# Patient Record
Sex: MALE | Race: WHITE
[De-identification: names, ages, dates, MRNs, and addresses within clinical notes are randomized per-mention and may not be internally consistent; named-entity substitution may affect disease eponyms.]

---

## 2018-07-25 ENCOUNTER — HOSPITAL ENCOUNTER (OUTPATIENT)
Dept: HOSPITAL 47 - OR | Age: 18
Discharge: HOME | End: 2018-07-25
Payer: COMMERCIAL

## 2018-07-25 VITALS — DIASTOLIC BLOOD PRESSURE: 74 MMHG | HEART RATE: 71 BPM | SYSTOLIC BLOOD PRESSURE: 113 MMHG

## 2018-07-25 VITALS — RESPIRATION RATE: 16 BRPM

## 2018-07-25 VITALS — BODY MASS INDEX: 19 KG/M2

## 2018-07-25 VITALS — TEMPERATURE: 97.3 F

## 2018-07-25 DIAGNOSIS — Q24.8: ICD-10-CM

## 2018-07-25 DIAGNOSIS — L05.91: Primary | ICD-10-CM

## 2018-07-25 PROCEDURE — 11770 EXC PILONIDAL CYST SIMPLE: CPT

## 2018-07-25 PROCEDURE — 88304 TISSUE EXAM BY PATHOLOGIST: CPT

## 2018-07-25 RX ADMIN — CEFAZOLIN ONE GM: 10 INJECTION, POWDER, FOR SOLUTION INTRAVENOUS at 08:01

## 2018-07-25 RX ADMIN — METRONIDAZOLE ONE MLS: 500 INJECTION, SOLUTION INTRAVENOUS at 08:02

## 2018-07-25 RX ADMIN — HYDROMORPHONE HYDROCHLORIDE PRN MG: 1 INJECTION, SOLUTION INTRAMUSCULAR; INTRAVENOUS; SUBCUTANEOUS at 09:40

## 2018-07-25 RX ADMIN — CEFAZOLIN ONE GM: 10 INJECTION, POWDER, FOR SOLUTION INTRAVENOUS at 08:02

## 2018-07-25 RX ADMIN — METRONIDAZOLE ONE MLS: 500 INJECTION, SOLUTION INTRAVENOUS at 08:20

## 2018-07-25 RX ADMIN — HYDROMORPHONE HYDROCHLORIDE PRN MG: 1 INJECTION, SOLUTION INTRAMUSCULAR; INTRAVENOUS; SUBCUTANEOUS at 09:33

## 2018-07-25 NOTE — P.GSHP
History of Present Illness


H&P Date: 07/25/18


Chief Complaint: Pilonidal cyst





Patient is a 17-year-old male who has had complaints of pain in the region of 

the pilonidal region over the last several months.  Denies fevers.  States it 

is actually gotten somewhat smaller since his last evaluation.  Patient has a 

sinus opening measuring about 3-4 mm with no erythema.  No prior surgical 

intervention or drainage.





Past Medical History


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  

PILONIDAL CYST


History of Any Multi-Drug Resistant Organisms: None Reported


Past Surgical History: No Surgical Hx Reported


Past Anesthesia/Blood Transfusion Reactions: No Reported Reaction


Additional Past Anesthesia/Blood Transfusion Reaction / Comment(s): NO PRIOR SX 

HX


Smoking Status: Never smoker





- Past Family History


  ** Mother


Family Medical History: No Reported History





Medications and Allergies


 Home Medications











 Medication  Instructions  Recorded  Confirmed  Type


 


No Known Home Medications  07/19/18 07/25/18 History











 Allergies











Allergy/AdvReac Type Severity Reaction Status Date / Time


 


No Known Allergies Allergy   Verified 07/25/18 06:52














Surgical - Exam


 Vital Signs











Temp Pulse Resp BP Pulse Ox


 


 97.6 F   79   16   117/75   97 


 


 07/25/18 06:51  07/25/18 06:51  07/25/18 06:51  07/25/18 06:51  07/25/18 06:51

















Physical exam:


General: Well-developed, well-nourished


HEENT: Normocephalic, sclerae nonicteric


Abdomen: Nontender, nondistended


Extremities: No edema, pilonidal region with 3-4 mm sinus


Neuro: Alert and oriented





Assessment and Plan


(1) Pilonidal cyst


Narrative/Plan: 


Will proceed with pilonidal cystectomy at this time.  Risks were previously 

discussed with the patient.


Current Visit: Yes   Status: Acute   Code(s): L05.91 - PILONIDAL CYST WITHOUT 

ABSCESS   SNOMED Code(s): 30738298

## 2018-07-25 NOTE — P.OP
Date of Procedure: 07/25/18


Procedure(s) Performed: 


PREOPERATIVE DIAGNOSIS: Pilonidal cyst


POSTOPERATIVE DIAGNOSIS: Same


PROCEDURE: Pilonidal cystectomy


SURGEON: Flora YORKL: Minimal


ANESTHESIA: General


COMPLICATIONS: None


OPERATIVE PROCEDURE: Patient was placed prone on the operating table.  The 

gluteal crease was prepped and draped in usual sterile fashion after the 

patient was placed in the prone jackknife position.  The pilonidal cyst opening 

was instilled with methylene blue solution.  An elliptical incision was made 

around the pilonidal cyst opening.  Dissection took place down through the 

subcutaneous tissues using electrocautery.  Care was taken to be sure that the 

entire cyst cavity was removed.  Specimen was sent to pathology.  The operative 

site was irrigated with saline.  It was then infiltrated with local anesthesia.

  The subcutaneous tissues were then reapproximated using interrupted 3-0 

Vicryl sutures.  The skin was closed using a running 4-0 nylon sutures.  

Sterile dressings then applied.


DISPOSITION: Stable to recovery room

## 2018-10-12 ENCOUNTER — HOSPITAL ENCOUNTER (OUTPATIENT)
Dept: HOSPITAL 47 - EC | Age: 18
Setting detail: OBSERVATION
LOS: 1 days | Discharge: HOME | End: 2018-10-13
Attending: SURGERY | Admitting: SURGERY
Payer: COMMERCIAL

## 2018-10-12 VITALS — BODY MASS INDEX: 19 KG/M2

## 2018-10-12 VITALS — RESPIRATION RATE: 16 BRPM

## 2018-10-12 DIAGNOSIS — R06.7: ICD-10-CM

## 2018-10-12 DIAGNOSIS — R68.83: ICD-10-CM

## 2018-10-12 DIAGNOSIS — R05: ICD-10-CM

## 2018-10-12 DIAGNOSIS — K59.00: ICD-10-CM

## 2018-10-12 DIAGNOSIS — E87.2: Primary | ICD-10-CM

## 2018-10-12 DIAGNOSIS — D72.829: ICD-10-CM

## 2018-10-12 DIAGNOSIS — R19.7: ICD-10-CM

## 2018-10-12 DIAGNOSIS — R10.31: ICD-10-CM

## 2018-10-12 LAB
ALBUMIN SERPL-MCNC: 4.8 G/DL (ref 3.5–5)
ALP SERPL-CCNC: 66 U/L (ref 38–126)
ALT SERPL-CCNC: 33 U/L (ref 21–72)
AMYLASE SERPL-CCNC: 61 U/L (ref 30–110)
ANION GAP SERPL CALC-SCNC: 13 MMOL/L
APTT BLD: 22.2 SEC (ref 22–30)
AST SERPL-CCNC: 24 U/L (ref 17–59)
BASOPHILS # BLD AUTO: 0 K/UL (ref 0–0.2)
BASOPHILS NFR BLD AUTO: 0 %
BUN SERPL-SCNC: 13 MG/DL (ref 9–20)
CALCIUM SPEC-MCNC: 9.9 MG/DL (ref 8.4–10.2)
CHLORIDE SERPL-SCNC: 103 MMOL/L (ref 98–107)
CO2 SERPL-SCNC: 26 MMOL/L (ref 22–30)
EOSINOPHIL # BLD AUTO: 0.1 K/UL (ref 0–0.7)
EOSINOPHIL NFR BLD AUTO: 0 %
ERYTHROCYTE [DISTWIDTH] IN BLOOD BY AUTOMATED COUNT: 5.01 M/UL (ref 4.3–5.9)
ERYTHROCYTE [DISTWIDTH] IN BLOOD: 12.4 % (ref 11.5–15.5)
GLUCOSE SERPL-MCNC: 133 MG/DL (ref 74–99)
HCT VFR BLD AUTO: 45.5 % (ref 39–53)
HGB BLD-MCNC: 16.1 GM/DL (ref 13–17.5)
INR PPP: 1.2 (ref ?–1.2)
LIPASE SERPL-CCNC: 59 U/L (ref 23–300)
LYMPHOCYTES # SPEC AUTO: 1.4 K/UL (ref 1–4.8)
LYMPHOCYTES NFR SPEC AUTO: 7 %
MCH RBC QN AUTO: 32.2 PG (ref 25–35)
MCHC RBC AUTO-ENTMCNC: 35.5 G/DL (ref 31–37)
MCV RBC AUTO: 90.9 FL (ref 80–100)
MONOCYTES # BLD AUTO: 0.6 K/UL (ref 0–1)
MONOCYTES NFR BLD AUTO: 3 %
NEUTROPHILS # BLD AUTO: 16.8 K/UL (ref 1.3–7.7)
NEUTROPHILS NFR BLD AUTO: 89 %
PH UR: 6 [PH] (ref 5–8)
PLATELET # BLD AUTO: 243 K/UL (ref 150–450)
POTASSIUM SERPL-SCNC: 3.3 MMOL/L (ref 3.5–5.1)
PROT SERPL-MCNC: 8.2 G/DL (ref 6.3–8.2)
PT BLD: 11.5 SEC (ref 9–12)
SODIUM SERPL-SCNC: 142 MMOL/L (ref 137–145)
SP GR UR: 1.02 (ref 1–1.03)
UROBILINOGEN UR QL STRIP: <2 MG/DL (ref ?–2)
WBC # BLD AUTO: 18.9 K/UL (ref 4–11)

## 2018-10-12 PROCEDURE — 96376 TX/PRO/DX INJ SAME DRUG ADON: CPT

## 2018-10-12 PROCEDURE — 74177 CT ABD & PELVIS W/CONTRAST: CPT

## 2018-10-12 PROCEDURE — 36415 COLL VENOUS BLD VENIPUNCTURE: CPT

## 2018-10-12 PROCEDURE — 87040 BLOOD CULTURE FOR BACTERIA: CPT

## 2018-10-12 PROCEDURE — 85610 PROTHROMBIN TIME: CPT

## 2018-10-12 PROCEDURE — 96365 THER/PROPH/DIAG IV INF INIT: CPT

## 2018-10-12 PROCEDURE — 99285 EMERGENCY DEPT VISIT HI MDM: CPT

## 2018-10-12 PROCEDURE — 85027 COMPLETE CBC AUTOMATED: CPT

## 2018-10-12 PROCEDURE — 80048 BASIC METABOLIC PNL TOTAL CA: CPT

## 2018-10-12 PROCEDURE — 96361 HYDRATE IV INFUSION ADD-ON: CPT

## 2018-10-12 PROCEDURE — 85025 COMPLETE CBC W/AUTO DIFF WBC: CPT

## 2018-10-12 PROCEDURE — 82150 ASSAY OF AMYLASE: CPT

## 2018-10-12 PROCEDURE — 85730 THROMBOPLASTIN TIME PARTIAL: CPT

## 2018-10-12 PROCEDURE — 96375 TX/PRO/DX INJ NEW DRUG ADDON: CPT

## 2018-10-12 PROCEDURE — 83690 ASSAY OF LIPASE: CPT

## 2018-10-12 PROCEDURE — 83605 ASSAY OF LACTIC ACID: CPT

## 2018-10-12 PROCEDURE — 81003 URINALYSIS AUTO W/O SCOPE: CPT

## 2018-10-12 PROCEDURE — 80053 COMPREHEN METABOLIC PANEL: CPT

## 2018-10-12 PROCEDURE — 80306 DRUG TEST PRSMV INSTRMNT: CPT

## 2018-10-12 RX ADMIN — MORPHINE SULFATE PRN MG: 4 INJECTION, SOLUTION INTRAMUSCULAR; INTRAVENOUS at 18:25

## 2018-10-12 RX ADMIN — KETOROLAC TROMETHAMINE PRN MG: 30 INJECTION, SOLUTION INTRAMUSCULAR at 22:39

## 2018-10-12 RX ADMIN — CEFAZOLIN SCH MLS/HR: 330 INJECTION, POWDER, FOR SOLUTION INTRAMUSCULAR; INTRAVENOUS at 17:40

## 2018-10-12 NOTE — CT
EXAMINATION TYPE: CT abdomen pelvis w con

 

DATE OF EXAM: 10/12/2018

 

COMPARISON: None

 

HISTORY: Right lower quadrant abdominal pain, nausea and vomiting after eating.

 

CT DLP: 241.7 mGycm, Automated Exposure Control for Dose Reduction was Utilized.

 

CONTRAST: 

CT scan of the abdomen and pelvis is performed without oral but with IV Contrast, patient injected wi
th 100ml mL of Isovue M300.

 

FINDINGS:

 

LUNG BASES:  No significant abnormality is appreciated.

 

LIVER/GB:   No significant abnormality is appreciated.

 

PANCREAS:  No significant abnormality is seen.

 

SPLEEN:  No significant abnormality is seen.

 

ADRENALS:  No significant abnormality is seen.

 

KIDNEYS:  No significant abnormality is seen.

 

BOWEL: Evaluation bowel is suboptimal secondary to lack of enteric contrast and patient having very l
ittle intra-abdominal fat. There is no suspicious small or large bowel dilatation identified. Appendi
x difficult to visualize with certainty. No significant inflammatory change at base of cecum is prese
nt. There is tube shaped structure right lower quadrant posterior to the cecum axial image 52 could r
eflect mildly dilated appendix versus fluid-filled nondilated small bowel. No surrounding inflammator
y changes present. It measures roughly up to 8 mm in size coronal image 31.

 

 PROSTATE/SEMINAL VESICLES: Single left-sided pelvic phlebolith is present.

 

LYMPH NODES:  No greater than 1cm abdominal or pelvic lymph nodes are appreciated.

 

OSSEOUS STRUCTURES: Bilateral pars defects L5 level. Slight or minimal grade 1 anterolisthesis L5 on 
S1.

 

OTHER:  No significant additional abnormality is seen.

 

IMPRESSION:  No significant acute finding is clearly seen to account for patient's clinical symptoms.
 Suboptimal study to evaluate for acute appendicitis noted. I cannot be entirely excluded on this lucian
dy. No bowel obstruction is present.

## 2018-10-12 NOTE — ED
Abdominal Pain HPI





- General


Chief Complaint: Abdominal Pain


Stated Complaint: abd pain


Time Seen by Provider: 10/12/18 14:17


Source: patient, family, RN notes reviewed, old records reviewed


Mode of arrival: wheelchair


Limitations: no limitations





- History of Present Illness


Initial Comments: 





This patient's a 18-year-old male presents emergency department today with 

chief complaint of nausea, and lower abdominal pain.  He reports it started 

this morning at 9 AM.  He believes it was related to eating a pizza from a gas 

station.  Patient states that he feels generally very ill and weak.  Patient's 

had multiple episodes of near syncope due to weakness.  Patient states that he 

has had no chest pain or shortness of breath.  He reports he has chills.  He 

denies any urinary symptoms he has had no diarrhea.  Patient's past medical 

history includes pilonidal cyst surgery.  No other previous abdominal surgeries 

or medical history





- Related Data


 Home Medications











 Medication  Instructions  Recorded  Confirmed


 


Acetaminophen Tab [Tylenol Tab] 1,000 mg PO Q6H PRN 10/12/18 10/12/18











 Allergies











Allergy/AdvReac Type Severity Reaction Status Date / Time


 


No Known Allergies Allergy   Verified 10/12/18 14:27














Review of Systems


ROS Statement: 


Those systems with pertinent positive or pertinent negative responses have been 

documented in the HPI.





ROS Other: All systems not noted in ROS Statement are negative.





Past Medical History


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


History of Any Multi-Drug Resistant Organisms: None Reported


Past Surgical History: No Surgical Hx Reported


Additional Past Surgical History / Comment(s): pilinodal cyst removed


Past Anesthesia/Blood Transfusion Reactions: No Reported Reaction


Additional Past Anesthesia/Blood Transfusion Reaction / Comment(s): NO PRIOR SX 

HX


Past Psychological History: No Psychological Hx Reported


Smoking Status: Never smoker


Past Alcohol Use History: None Reported


Past Drug Use History: None Reported





- Past Family History


  ** Mother


Family Medical History: No Reported History





General Exam





- General Exam Comments


Initial Comments: 





This patient's a 18-year-old male.  Patient appears weak and ill.


Limitations: no limitations


General appearance: alert


Head exam: Present: atraumatic, normocephalic, normal inspection


Eye exam: Present: normal appearance, PERRL, EOMI.  Absent: scleral icterus, 

conjunctival injection, periorbital swelling


ENT exam: Present: normal exam, mucous membranes moist


Neck exam: Present: normal inspection.  Absent: tenderness, meningismus, 

lymphadenopathy


Respiratory exam: Present: normal lung sounds bilaterally.  Absent: respiratory 

distress, wheezes, rales, rhonchi, stridor


Cardiovascular Exam: Present: regular rate, normal rhythm, normal heart sounds.

  Absent: systolic murmur, diastolic murmur, rubs, gallop, clicks


GI/Abdominal exam: Present: soft, tenderness (Lower abdominal tenderness noted, 

RLQ tenderness), normal bowel sounds.  Absent: distended, guarding, rebound, 

rigid


Extremities exam: Present: normal inspection, full ROM, normal capillary 

refill.  Absent: tenderness, pedal edema, joint swelling, calf tenderness


Back exam: Present: normal inspection


Neurological exam: Present: alert, oriented X3, CN II-XII intact





Course


 Vital Signs











  10/12/18 10/12/18 10/12/18





  13:31 14:37 16:07


 


Temperature 97.8 F  99 F


 


Pulse Rate 77  82


 


Respiratory 16  18





Rate   


 


Blood Pressure 92/54 104/68 124/73


 


O2 Sat by Pulse 98  98





Oximetry   














Medical Decision Making





- Medical Decision Making





Patient is a 18-year-old male presents for his murmurs any tingling of lower 

abdominal pain and nausea.  Examiner episode of vomiting and emergency 

department.  Patient appeared very ill and weak.  Evidence of Jordyn's.  

Patient had significant tenderness over the lower abdomen and right lower 

quadrant.  Concern for appendicitis.  Patient was started on IV fluids labwork 

obtained.  Blood culture was obtained.  Patient has elevated white blood cell 

count of 18.6 with left shift of 16,000.  Lactic acid was noted to be elevated 

at 2.8.  Patient was started on IV Zosyn and given a liter boluses.  Computed 

tomography scan was suboptimal study due to lack of intra-abdominal fat.  

Patient also seemed to have a reaction due to the contrast and after receiving 

the contrast he had a coughing and sneezing episode.  Patient was given IV 

Benadryl and Solu-Medrol.  No evidence of significant appendicitis at this 

time.  Appendix was slightly enlarged at 8 mm.  Patient will be admitted at 

this time to Dr. Valverde.  Continue Zosyn.





- Lab Data


Result diagrams: 


 10/12/18 14:55





 10/12/18 14:55


 Lab Results











  10/12/18 10/12/18 10/12/18 Range/Units





  14:55 14:55 14:55 


 


WBC   18.9 H   (4.0-11.0)  k/uL


 


RBC   5.01   (4.30-5.90)  m/uL


 


Hgb   16.1   (13.0-17.5)  gm/dL


 


Hct   45.5   (39.0-53.0)  %


 


MCV   90.9   (80.0-100.0)  fL


 


MCH   32.2   (25.0-35.0)  pg


 


MCHC   35.5   (31.0-37.0)  g/dL


 


RDW   12.4   (11.5-15.5)  %


 


Plt Count   243   (150-450)  k/uL


 


Neutrophils %   89   %


 


Lymphocytes %   7   %


 


Monocytes %   3   %


 


Eosinophils %   0   %


 


Basophils %   0   %


 


Neutrophils #   16.8 H   (1.3-7.7)  k/uL


 


Lymphocytes #   1.4   (1.0-4.8)  k/uL


 


Monocytes #   0.6   (0-1.0)  k/uL


 


Eosinophils #   0.1   (0-0.7)  k/uL


 


Basophils #   0.0   (0-0.2)  k/uL


 


PT     (9.0-12.0)  sec


 


INR     (<1.2)  


 


APTT     (22.0-30.0)  sec


 


Sodium  142    (137-145)  mmol/L


 


Potassium  3.3 L    (3.5-5.1)  mmol/L


 


Chloride  103    ()  mmol/L


 


Carbon Dioxide  26    (22-30)  mmol/L


 


Anion Gap  13    mmol/L


 


BUN  13    (9-20)  mg/dL


 


Creatinine  0.77    (0.66-1.25)  mg/dL


 


Est GFR (CKD-EPI)AfAm  >90    (>60 ml/min/1.73 sqM)  


 


Est GFR (CKD-EPI)NonAf  >90    (>60 ml/min/1.73 sqM)  


 


Glucose  133 H    (74-99)  mg/dL


 


Plasma Lactic Acid Sulaiman    2.8 H*  (0.7-2.0)  mmol/L


 


Calcium  9.9    (8.4-10.2)  mg/dL


 


Total Bilirubin  0.7    (0.2-1.3)  mg/dL


 


AST  24    (17-59)  U/L


 


ALT  33    (21-72)  U/L


 


Alkaline Phosphatase  66    ()  U/L


 


Total Protein  8.2    (6.3-8.2)  g/dL


 


Albumin  4.8    (3.5-5.0)  g/dL


 


Amylase  61    ()  U/L


 


Lipase  59    ()  U/L


 


Urine Color     


 


Urine Appearance     (Clear)  


 


Urine pH     (5.0-8.0)  


 


Ur Specific Gravity     (1.001-1.035)  


 


Urine Protein     (Negative)  


 


Urine Glucose (UA)     (Negative)  


 


Urine Ketones     (Negative)  


 


Urine Blood     (Negative)  


 


Urine Nitrite     (Negative)  


 


Urine Bilirubin     (Negative)  


 


Urine Urobilinogen     (<2.0)  mg/dL


 


Ur Leukocyte Esterase     (Negative)  


 


Urine Opiates Screen     (NotDetected)  


 


Ur Oxycodone Screen     (NotDetected)  


 


Urine Methadone Screen     (NotDetected)  


 


Ur Propoxyphene Screen     (NotDetected)  


 


Ur Barbiturates Screen     (NotDetected)  


 


U Tricyclic Antidepress     (NotDetected)  


 


Ur Phencyclidine Scrn     (NotDetected)  


 


Ur Amphetamines Screen     (NotDetected)  


 


U Methamphetamines Scrn     (NotDetected)  


 


U Benzodiazepines Scrn     (NotDetected)  


 


Urine Cocaine Screen     (NotDetected)  


 


U Marijuana (THC) Screen     (NotDetected)  














  10/12/18 10/12/18 Range/Units





  14:55 14:55 


 


WBC    (4.0-11.0)  k/uL


 


RBC    (4.30-5.90)  m/uL


 


Hgb    (13.0-17.5)  gm/dL


 


Hct    (39.0-53.0)  %


 


MCV    (80.0-100.0)  fL


 


MCH    (25.0-35.0)  pg


 


MCHC    (31.0-37.0)  g/dL


 


RDW    (11.5-15.5)  %


 


Plt Count    (150-450)  k/uL


 


Neutrophils %    %


 


Lymphocytes %    %


 


Monocytes %    %


 


Eosinophils %    %


 


Basophils %    %


 


Neutrophils #    (1.3-7.7)  k/uL


 


Lymphocytes #    (1.0-4.8)  k/uL


 


Monocytes #    (0-1.0)  k/uL


 


Eosinophils #    (0-0.7)  k/uL


 


Basophils #    (0-0.2)  k/uL


 


PT  11.5   (9.0-12.0)  sec


 


INR  1.2 H   (<1.2)  


 


APTT  22.2   (22.0-30.0)  sec


 


Sodium    (137-145)  mmol/L


 


Potassium    (3.5-5.1)  mmol/L


 


Chloride    ()  mmol/L


 


Carbon Dioxide    (22-30)  mmol/L


 


Anion Gap    mmol/L


 


BUN    (9-20)  mg/dL


 


Creatinine    (0.66-1.25)  mg/dL


 


Est GFR (CKD-EPI)AfAm    (>60 ml/min/1.73 sqM)  


 


Est GFR (CKD-EPI)NonAf    (>60 ml/min/1.73 sqM)  


 


Glucose    (74-99)  mg/dL


 


Plasma Lactic Acid Sulaiman    (0.7-2.0)  mmol/L


 


Calcium    (8.4-10.2)  mg/dL


 


Total Bilirubin    (0.2-1.3)  mg/dL


 


AST    (17-59)  U/L


 


ALT    (21-72)  U/L


 


Alkaline Phosphatase    ()  U/L


 


Total Protein    (6.3-8.2)  g/dL


 


Albumin    (3.5-5.0)  g/dL


 


Amylase    ()  U/L


 


Lipase    ()  U/L


 


Urine Color   Yellow  


 


Urine Appearance   Clear  (Clear)  


 


Urine pH   6.0  (5.0-8.0)  


 


Ur Specific Gravity   1.018  (1.001-1.035)  


 


Urine Protein   Negative  (Negative)  


 


Urine Glucose (UA)   Negative  (Negative)  


 


Urine Ketones   Negative  (Negative)  


 


Urine Blood   Negative  (Negative)  


 


Urine Nitrite   Negative  (Negative)  


 


Urine Bilirubin   Negative  (Negative)  


 


Urine Urobilinogen   <2.0  (<2.0)  mg/dL


 


Ur Leukocyte Esterase   Negative  (Negative)  


 


Urine Opiates Screen   Not Detected  (NotDetected)  


 


Ur Oxycodone Screen   Not Detected  (NotDetected)  


 


Urine Methadone Screen   Not Detected  (NotDetected)  


 


Ur Propoxyphene Screen   Not Detected  (NotDetected)  


 


Ur Barbiturates Screen   Not Detected  (NotDetected)  


 


U Tricyclic Antidepress   Not Detected  (NotDetected)  


 


Ur Phencyclidine Scrn   Not Detected  (NotDetected)  


 


Ur Amphetamines Screen   Not Detected  (NotDetected)  


 


U Methamphetamines Scrn   Not Detected  (NotDetected)  


 


U Benzodiazepines Scrn   Not Detected  (NotDetected)  


 


Urine Cocaine Screen   Not Detected  (NotDetected)  


 


U Marijuana (THC) Screen   Not Detected  (NotDetected)  














- Radiology Data


Radiology results: report reviewed





Disposition


Clinical Impression: 


 Abdominal pain, Leukocytosis, Lactic acidosis





Disposition: ADMITTED AS IP TO [facility]


Condition: Stable


Is patient prescribed a controlled substance at d/c from ED?: No


Referrals: 


None,Stated [Primary Care Provider] - 1-2 days


Time of Disposition: 16:31

## 2018-10-12 NOTE — P.GSHP
History of Present Illness


H&P Date: 10/12/18


Chief Complaint: Abdominal pain





The patient's a 18-year-old young man that presented to the emergency 

department with abdominal pain.  Healing normally yesterday.  Dawn 9 this 

morning he noticed some discomfort in the abdomen.  It got progressively worse.

  Severe cramping and pain.  He had some chills.  Developed nausea and vomiting 

so he came into the emergency department.  He has not been ill in the weeks 

proceeding this.  He did have a similar admission about 2 years ago when he was 

admitted to rule out appendicitis.  That resolved without any surgical 

treatment.  Denies fevers today.  He had some constipation today.  He has had 

episodes of diarrhea recently.  On a bad day he is on 3-5 times.  There is been 

no blood in the stool or dark tarry stool.  No weight loss.  No family history 

of GI malignancy or inflammatory bowel disease.





- Constitutional


Constitutional: Reports as per HPI





Past Medical History


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  hx 

of pilonidal cyst


History of Any Multi-Drug Resistant Organisms: None Reported


Past Surgical History: No Surgical Hx Reported


Additional Past Surgical History / Comment(s): pilinodal cyst removed


Past Anesthesia/Blood Transfusion Reactions: No Reported Reaction


Additional Past Anesthesia/Blood Transfusion Reaction / Comment(s): NO PRIOR SX 

HX


Smoking Status: Never smoker





- Past Family History


  ** Mother


Family Medical History: No Reported History





  ** Father


Additional Family Medical History / Comment(s): hernia





Medications and Allergies


 Home Medications











 Medication  Instructions  Recorded  Confirmed  Type


 


Acetaminophen Tab [Tylenol Tab] 1,000 mg PO Q6H PRN 10/12/18 10/12/18 History











 Allergies











Allergy/AdvReac Type Severity Reaction Status Date / Time


 


No Known Allergies Allergy   Verified 10/12/18 14:27














Surgical - Exam


Osteopathic Statement: *.  No significant issues noted on an osteopathic 

structural exam other than those noted in the History and Physical/Consult.


 Vital Signs











Temp Pulse Resp BP Pulse Ox


 


 97.8 F   77   16   92/54   98 


 


 10/12/18 13:31  10/12/18 13:31  10/12/18 13:31  10/12/18 13:31  10/12/18 13:31














- General


well developed, well nourished, no distress





- Eyes


normal ocular movement





- ENT


normal mucosa, no congestion





- Neck


no no masses, trachea midline, no lymphadectomy (Several subcentimeter lymph 

nodes are noted in the neck.  A subcentimeter cyst is noted in the left 

posterior neck.)





- Respiratory


normal expansion, normal respiratory effort, clear to auscultation


absent: wheezing





- Cardiovascular


Rhythm: regular





- Abdomen





No significant tenderness to deep palpation in the right lower quadrant.  He 

states it feels better than when he presented to the emergency department


Abdomen: soft, tender (Mild epigastric tenderness.  ), bowel sounds, no guarding

, no rigid, no rebound





- Integumentary


no rash





- Neurologic


no disoriented, no combative





- Psychiatric


oriented to time, oriented to person, oriented to place, speech is normal, 

memory intact





Results





- Labs





 10/12/18 14:55





 10/12/18 14:55


 Abnormal Lab Results - Last 24 Hours (Table)











  10/12/18 10/12/18 10/12/18 Range/Units





  14:55 14:55 14:55 


 


WBC   18.9 H   (4.0-11.0)  k/uL


 


Neutrophils #   16.8 H   (1.3-7.7)  k/uL


 


INR     (<1.2)  


 


Potassium  3.3 L    (3.5-5.1)  mmol/L


 


Glucose  133 H    (74-99)  mg/dL


 


Plasma Lactic Acid Sulaiman    2.8 H*  (0.7-2.0)  mmol/L














  10/12/18 Range/Units





  14:55 


 


WBC   (4.0-11.0)  k/uL


 


Neutrophils #   (1.3-7.7)  k/uL


 


INR  1.2 H  (<1.2)  


 


Potassium   (3.5-5.1)  mmol/L


 


Glucose   (74-99)  mg/dL


 


Plasma Lactic Acid Sulaiman   (0.7-2.0)  mmol/L








 Diabetes panel











  10/12/18 Range/Units





  14:55 


 


Sodium  142  (137-145)  mmol/L


 


Potassium  3.3 L  (3.5-5.1)  mmol/L


 


Chloride  103  ()  mmol/L


 


Carbon Dioxide  26  (22-30)  mmol/L


 


BUN  13  (9-20)  mg/dL


 


Creatinine  0.77  (0.66-1.25)  mg/dL


 


Glucose  133 H  (74-99)  mg/dL


 


Calcium  9.9  (8.4-10.2)  mg/dL


 


AST  24  (17-59)  U/L


 


ALT  33  (21-72)  U/L


 


Alkaline Phosphatase  66  ()  U/L


 


Total Protein  8.2  (6.3-8.2)  g/dL


 


Albumin  4.8  (3.5-5.0)  g/dL








 Calcium panel











  10/12/18 Range/Units





  14:55 


 


Calcium  9.9  (8.4-10.2)  mg/dL


 


Albumin  4.8  (3.5-5.0)  g/dL








 Pituitary panel











  10/12/18 Range/Units





  14:55 


 


Sodium  142  (137-145)  mmol/L


 


Potassium  3.3 L  (3.5-5.1)  mmol/L


 


Chloride  103  ()  mmol/L


 


Carbon Dioxide  26  (22-30)  mmol/L


 


BUN  13  (9-20)  mg/dL


 


Creatinine  0.77  (0.66-1.25)  mg/dL


 


Glucose  133 H  (74-99)  mg/dL


 


Calcium  9.9  (8.4-10.2)  mg/dL








 Adrenal panel











  10/12/18 Range/Units





  14:55 


 


Sodium  142  (137-145)  mmol/L


 


Potassium  3.3 L  (3.5-5.1)  mmol/L


 


Chloride  103  ()  mmol/L


 


Carbon Dioxide  26  (22-30)  mmol/L


 


BUN  13  (9-20)  mg/dL


 


Creatinine  0.77  (0.66-1.25)  mg/dL


 


Glucose  133 H  (74-99)  mg/dL


 


Calcium  9.9  (8.4-10.2)  mg/dL


 


Total Bilirubin  0.7  (0.2-1.3)  mg/dL


 


AST  24  (17-59)  U/L


 


ALT  33  (21-72)  U/L


 


Alkaline Phosphatase  66  ()  U/L


 


Total Protein  8.2  (6.3-8.2)  g/dL


 


Albumin  4.8  (3.5-5.0)  g/dL














- Imaging


CT scan - abdomen: report reviewed, image reviewed





Assessment and Plan


(1) Abdominal pain


Current Visit: Yes   Status: Acute   Code(s): R10.9 - UNSPECIFIED ABDOMINAL 

PAIN   SNOMED Code(s): 02502212


   





(2) Lactic acidosis


Current Visit: Yes   Status: Acute   Code(s): E87.2 - ACIDOSIS   SNOMED Code(s)

: [id number]


   





(3) Leukocytosis


Current Visit: Yes   Status: Acute   Code(s): D72.829 - ELEVATED WHITE BLOOD 

CELL COUNT, UNSPECIFIED   SNOMED Code(s): 029303390


   


Plan: 





The patient is currently feeling better with hydration and some pain 

medication.  He'll be watched overnight hydrated.  Serial exams.  If he 

continues to have right lower quadrant pain in the leukocytosis doesn't resolve 

then keep a candidate for laparoscopic appendectomy.  Especially since he's had 

a previous episode of this.  Further recommendations to follow.  May possibly 

need outpatient workup for the diarrhea.

## 2018-10-13 VITALS — HEART RATE: 81 BPM | SYSTOLIC BLOOD PRESSURE: 90 MMHG | TEMPERATURE: 98.1 F | DIASTOLIC BLOOD PRESSURE: 57 MMHG

## 2018-10-13 LAB
ANION GAP SERPL CALC-SCNC: 7 MMOL/L
BUN SERPL-SCNC: 12 MG/DL (ref 9–20)
CALCIUM SPEC-MCNC: 8.9 MG/DL (ref 8.4–10.2)
CHLORIDE SERPL-SCNC: 109 MMOL/L (ref 98–107)
CO2 SERPL-SCNC: 24 MMOL/L (ref 22–30)
ERYTHROCYTE [DISTWIDTH] IN BLOOD BY AUTOMATED COUNT: 4.52 M/UL (ref 4.3–5.9)
ERYTHROCYTE [DISTWIDTH] IN BLOOD: 12.4 % (ref 11.5–15.5)
GLUCOSE SERPL-MCNC: 95 MG/DL (ref 74–99)
HCT VFR BLD AUTO: 41.1 % (ref 39–53)
HGB BLD-MCNC: 14 GM/DL (ref 13–17.5)
MCH RBC QN AUTO: 31 PG (ref 25–35)
MCHC RBC AUTO-ENTMCNC: 34.1 G/DL (ref 31–37)
MCV RBC AUTO: 90.8 FL (ref 80–100)
PLATELET # BLD AUTO: 211 K/UL (ref 150–450)
POTASSIUM SERPL-SCNC: 4 MMOL/L (ref 3.5–5.1)
SODIUM SERPL-SCNC: 140 MMOL/L (ref 137–145)
WBC # BLD AUTO: 12.8 K/UL (ref 4–11)

## 2018-10-13 RX ADMIN — DEXTROSE MONOHYDRATE SCH MLS/HR: 5 INJECTION, SOLUTION INTRAVENOUS at 08:13

## 2018-10-13 RX ADMIN — MORPHINE SULFATE PRN MG: 4 INJECTION, SOLUTION INTRAMUSCULAR; INTRAVENOUS at 00:17

## 2018-10-13 RX ADMIN — CEFAZOLIN SCH: 330 INJECTION, POWDER, FOR SOLUTION INTRAMUSCULAR; INTRAVENOUS at 05:03

## 2018-10-13 RX ADMIN — CEFAZOLIN SCH: 330 INJECTION, POWDER, FOR SOLUTION INTRAMUSCULAR; INTRAVENOUS at 14:00

## 2018-10-13 RX ADMIN — KETOROLAC TROMETHAMINE PRN MG: 30 INJECTION, SOLUTION INTRAMUSCULAR at 13:52

## 2018-10-13 RX ADMIN — DEXTROSE MONOHYDRATE SCH MLS/HR: 5 INJECTION, SOLUTION INTRAVENOUS at 00:11

## 2018-10-13 NOTE — P.PN
Subjective


Progress Note Date: 10/13/18


Principal diagnosis: 





Abdominal pain, nausea, vomiting





The patient was admitted through ER yesterday with abdominal pain, nausea and 

vomiting.  He had a 18,900 white count.  He was hydrated, bowel rest and given 

pain control.  He's feeling better today.  No nausea.  He is hungry.  He has 

some mild tenderness in his abdomen.





Objective





- Vital Signs


Vital signs: 


 Vital Signs











Temp  98.1 F   10/13/18 07:13


 


Pulse  81   10/13/18 07:13


 


Resp  16   10/13/18 08:00


 


BP  90/57   10/13/18 07:13


 


Pulse Ox  99   10/13/18 07:13








 Intake & Output











 10/12/18 10/13/18 10/13/18





 18:59 06:59 18:59


 


Weight 56.699 kg  


 


Other:   


 


  # Voids  2 














- Constitutional


General appearance: Present: cooperative, no acute distress





- Respiratory


Respiratory: bilateral: CTA





- Cardiovascular


Rhythm: regular





- Gastrointestinal


General gastrointestinal: Present: normal bowel sounds, soft, tenderness (There 

is some very mild tenderness to deep palpation in the right lower quadrant 

without guarding or rebound)





- Labs


CBC & Chem 7: 


 10/13/18 08:06





 10/13/18 08:06


Labs: 


 Abnormal Lab Results - Last 24 Hours (Table)











  10/12/18 10/12/18 10/12/18 Range/Units





  14:55 14:55 14:55 


 


WBC   18.9 H   (4.0-11.0)  k/uL


 


Neutrophils #   16.8 H   (1.3-7.7)  k/uL


 


INR     (<1.2)  


 


Potassium  3.3 L    (3.5-5.1)  mmol/L


 


Chloride     ()  mmol/L


 


Glucose  133 H    (74-99)  mg/dL


 


Plasma Lactic Acid Sulaiman    2.8 H*  (0.7-2.0)  mmol/L














  10/12/18 10/13/18 10/13/18 Range/Units





  14:55 08:06 08:06 


 


WBC   12.8 H   (4.0-11.0)  k/uL


 


Neutrophils #     (1.3-7.7)  k/uL


 


INR  1.2 H    (<1.2)  


 


Potassium     (3.5-5.1)  mmol/L


 


Chloride    109 H  ()  mmol/L


 


Glucose     (74-99)  mg/dL


 


Plasma Lactic Acid Sulaiman     (0.7-2.0)  mmol/L














Assessment and Plan


(1) Abdominal pain


Current Visit: Yes   Status: Acute   Code(s): R10.9 - UNSPECIFIED ABDOMINAL 

PAIN   SNOMED Code(s): 13513524


   





(2) Lactic acidosis


Current Visit: Yes   Status: Acute   Code(s): E87.2 - ACIDOSIS   SNOMED Code(s)

: 06393152


   





(3) Leukocytosis


Current Visit: Yes   Status: Acute   Code(s): D72.829 - ELEVATED WHITE BLOOD 

CELL COUNT, UNSPECIFIED   SNOMED Code(s): 707122441


   


Plan: 





Patient's clinically improved.  This is likely a viral syndrome.  We'll start 

him on a diet.  If he is able to tolerate food without increasing pain, nausea, 

vomiting, then we'll discharge him later today.  If he develops recurrence of 

symptoms and I would recommend a laparoscopy with appendectomy.

## 2018-10-15 ENCOUNTER — HOSPITAL ENCOUNTER (OUTPATIENT)
Dept: HOSPITAL 47 - EC | Age: 18
Setting detail: OBSERVATION
LOS: 1 days | Discharge: HOME | End: 2018-10-16
Attending: SURGERY | Admitting: SURGERY
Payer: COMMERCIAL

## 2018-10-15 VITALS — BODY MASS INDEX: 19.4 KG/M2

## 2018-10-15 DIAGNOSIS — K35.80: Primary | ICD-10-CM

## 2018-10-15 DIAGNOSIS — Z87.2: ICD-10-CM

## 2018-10-15 DIAGNOSIS — Z91.041: ICD-10-CM

## 2018-10-15 DIAGNOSIS — Z84.89: ICD-10-CM

## 2018-10-15 LAB
ALBUMIN SERPL-MCNC: 4.7 G/DL (ref 3.5–5)
ALP SERPL-CCNC: 49 U/L (ref 38–126)
ALT SERPL-CCNC: 37 U/L (ref 21–72)
ANION GAP SERPL CALC-SCNC: 10 MMOL/L
AST SERPL-CCNC: 33 U/L (ref 17–59)
BASOPHILS # BLD AUTO: 0 K/UL (ref 0–0.2)
BASOPHILS NFR BLD AUTO: 1 %
BUN SERPL-SCNC: 15 MG/DL (ref 9–20)
CALCIUM SPEC-MCNC: 9.6 MG/DL (ref 8.4–10.2)
CHLORIDE SERPL-SCNC: 102 MMOL/L (ref 98–107)
CO2 SERPL-SCNC: 30 MMOL/L (ref 22–30)
EOSINOPHIL # BLD AUTO: 0.2 K/UL (ref 0–0.7)
EOSINOPHIL NFR BLD AUTO: 3 %
ERYTHROCYTE [DISTWIDTH] IN BLOOD BY AUTOMATED COUNT: 5.07 M/UL (ref 4.3–5.9)
ERYTHROCYTE [DISTWIDTH] IN BLOOD: 12.4 % (ref 11.5–15.5)
GLUCOSE SERPL-MCNC: 77 MG/DL (ref 74–99)
HCT VFR BLD AUTO: 46 % (ref 39–53)
HGB BLD-MCNC: 16.2 GM/DL (ref 13–17.5)
LYMPHOCYTES # SPEC AUTO: 1.9 K/UL (ref 1–4.8)
LYMPHOCYTES NFR SPEC AUTO: 26 %
MCH RBC QN AUTO: 31.8 PG (ref 25–35)
MCHC RBC AUTO-ENTMCNC: 35.2 G/DL (ref 31–37)
MCV RBC AUTO: 90.6 FL (ref 80–100)
MONOCYTES # BLD AUTO: 0.4 K/UL (ref 0–1)
MONOCYTES NFR BLD AUTO: 5 %
NEUTROPHILS # BLD AUTO: 4.8 K/UL (ref 1.3–7.7)
NEUTROPHILS NFR BLD AUTO: 65 %
PH UR: 6.5 [PH] (ref 5–8)
PLATELET # BLD AUTO: 229 K/UL (ref 150–450)
POTASSIUM SERPL-SCNC: 4 MMOL/L (ref 3.5–5.1)
PROT SERPL-MCNC: 8.3 G/DL (ref 6.3–8.2)
SODIUM SERPL-SCNC: 142 MMOL/L (ref 137–145)
SP GR UR: 1.01 (ref 1–1.03)
UROBILINOGEN UR QL STRIP: <2 MG/DL (ref ?–2)
WBC # BLD AUTO: 7.4 K/UL (ref 4–11)

## 2018-10-15 PROCEDURE — 80053 COMPREHEN METABOLIC PANEL: CPT

## 2018-10-15 PROCEDURE — 85025 COMPLETE CBC W/AUTO DIFF WBC: CPT

## 2018-10-15 PROCEDURE — 81003 URINALYSIS AUTO W/O SCOPE: CPT

## 2018-10-15 PROCEDURE — 96366 THER/PROPH/DIAG IV INF ADDON: CPT

## 2018-10-15 PROCEDURE — 74177 CT ABD & PELVIS W/CONTRAST: CPT

## 2018-10-15 PROCEDURE — 96374 THER/PROPH/DIAG INJ IV PUSH: CPT

## 2018-10-15 PROCEDURE — 36415 COLL VENOUS BLD VENIPUNCTURE: CPT

## 2018-10-15 PROCEDURE — 87040 BLOOD CULTURE FOR BACTERIA: CPT

## 2018-10-15 PROCEDURE — 88304 TISSUE EXAM BY PATHOLOGIST: CPT

## 2018-10-15 PROCEDURE — 99285 EMERGENCY DEPT VISIT HI MDM: CPT

## 2018-10-15 PROCEDURE — 96365 THER/PROPH/DIAG IV INF INIT: CPT

## 2018-10-15 PROCEDURE — 96361 HYDRATE IV INFUSION ADD-ON: CPT

## 2018-10-15 PROCEDURE — 44970 LAPAROSCOPY APPENDECTOMY: CPT

## 2018-10-15 PROCEDURE — 96375 TX/PRO/DX INJ NEW DRUG ADDON: CPT

## 2018-10-15 RX ADMIN — DEXTROSE MONOHYDRATE SCH MLS/HR: 5 INJECTION, SOLUTION INTRAVENOUS at 23:45

## 2018-10-15 RX ADMIN — HYDROMORPHONE HYDROCHLORIDE PRN MG: 1 INJECTION, SOLUTION INTRAMUSCULAR; INTRAVENOUS; SUBCUTANEOUS at 21:28

## 2018-10-15 RX ADMIN — CEFAZOLIN ONE MLS/HR: 330 INJECTION, POWDER, FOR SOLUTION INTRAMUSCULAR; INTRAVENOUS at 15:37

## 2018-10-15 RX ADMIN — CEFAZOLIN ONE MLS/HR: 330 INJECTION, POWDER, FOR SOLUTION INTRAMUSCULAR; INTRAVENOUS at 20:37

## 2018-10-15 RX ADMIN — HEPARIN SODIUM SCH UNIT: 5000 INJECTION, SOLUTION INTRAVENOUS; SUBCUTANEOUS at 23:45

## 2018-10-15 NOTE — CT
EXAMINATION TYPE: CT abdomen pelvis w con

 

DATE OF EXAM: 10/15/2018

 

COMPARISON: CT abdomen pelvis from 3 days ago

 

HISTORY: RLQ pain x3 days

 

CT DLP: 518.4 mGycm, Automated Exposure Control for Dose Reduction was Utilized.

 

CONTRAST: 

CT scan of the abdomen and pelvis is performed without oral but with IV Contrast, patient injected wi
th 100 mL of Isovue 300.

 

FINDINGS:

 

LUNG BASES:  No significant abnormality is appreciated.

 

LIVER/GB:   No significant abnormality is appreciated.

 

PANCREAS:  No significant abnormality is seen.

 

SPLEEN:  No significant abnormality is seen.

 

ADRENALS:  No significant abnormality is seen.

 

KIDNEYS: Symmetric cortical medullary uptake and excretion from both kidneys is seen without hydronep
hrosis is identified bilaterally.

 

BOWEL: Appendix is better seen on current study ascending from the posterior aspect of cecum. It is m
ildly dilated up to 9 mm axial image 53 for reference. It is thicker in the tip in mid segments versu
s base. No significant surrounding inflammatory change is identified. Some mucosal hyperemia is felt 
present as there is no central air within the appendix lumen identified.

 

PROSTATE/SEMINAL VESICLES:  No gross abnormality seen.

 

LYMPH NODES:  No greater than 1cm abdominal or pelvic lymph nodes are appreciated.

 

OSSEOUS STRUCTURES: Bilateral pars defects L5 level redemonstrated without significant spondylolisthe
sis.

 

OTHER: Tiny amount of free fluid pelvis near axial image 72 slightly larger versus prior.

 

IMPRESSION: Better visualization of appendix which is mildly dilated and has suggestion of mucosal hy
peremia. No significant surrounding fat stranding is noted. Acute appendicitis cannot be excluded bas
ed on these findings.

## 2018-10-15 NOTE — P.GSHP
History of Present Illness


H&P Date: 10/15/18


Chief Complaint: Right lower quadrant abdominal pain





18-year-old male came to the ER on Friday with complaints of severe right lower 

quadrant pain.  Patient had a CAT scan showing some slight distention and 

possible mucosal thickening of the appendix.  White blood cell count was 

elevated.  Patient's pain did improve and he was thought to likely be suffering 

from a viral illness and he was discharged home.  He was given IV antibiotics 

on at least 2 separate occasions over the weekend however.  Today he was still 

having pain.  His family contacted my office wondering what they should do.  He 

came back to the ER today.  CAT scan was repeated.  CAT scan still shows some 

hyperemia of the appendix.  White blood cell count now is normal.  Denies 

fevers.  Has been tender in the right lower quadrant.





- Review of Systems


Comment: 





The patient denies any acute changes in vision or hearing, no dysphagia or 

odynophagia, no chest pain or shortness of breath, no dysuria or hematuria, no 

headache, no runny nose, no rectal bleeding or melena, no unexplained weight 

loss 





Past Medical History


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  hx 

of pilonidal cyst


History of Any Multi-Drug Resistant Organisms: None Reported


Past Surgical History: No Surgical Hx Reported


Additional Past Surgical History / Comment(s): pilinodal cyst removed


Past Anesthesia/Blood Transfusion Reactions: No Reported Reaction


Additional Past Anesthesia/Blood Transfusion Reaction / Comment(s): NO PRIOR SX 

HX


Past Psychological History: No Psychological Hx Reported


Smoking Status: Never smoker


Past Alcohol Use History: None Reported


Past Drug Use History: None Reported





- Past Family History


  ** Mother


Family Medical History: No Reported History





  ** Father


Additional Family Medical History / Comment(s): hernia





Medications and Allergies


 Home Medications











 Medication  Instructions  Recorded  Confirmed  Type


 


No Known Home Medications  10/15/18 10/15/18 History











 Allergies











Allergy/AdvReac Type Severity Reaction Status Date / Time


 


Iodinated Contrast- Oral and AdvReac  Cough/sneez Verified 10/15/18 14:22





IV Dye   e  














Surgical - Exam


 Vital Signs











Temp Pulse Resp BP Pulse Ox


 


 98.3 F   74   20   109/78   99 


 


 10/15/18 12:53  10/15/18 12:53  10/15/18 12:53  10/15/18 12:53  10/15/18 12:53

















Physical exam:


General: Well-developed, well-nourished


HEENT: Normocephalic, sclerae nonicteric


Abdomen: Right lower quadrant tenderness, nondistended


Extremities: No edema


Neuro: Alert and oriented





Results





- Labs





 10/15/18 13:45





 10/15/18 13:45


 Abnormal Lab Results - Last 24 Hours (Table)











  10/15/18 Range/Units





  13:45 


 


Total Protein  8.3 H  (6.3-8.2)  g/dL








 Diabetes panel











  10/15/18 Range/Units





  13:45 


 


Sodium  142  (137-145)  mmol/L


 


Potassium  4.0  (3.5-5.1)  mmol/L


 


Chloride  102  ()  mmol/L


 


Carbon Dioxide  30  (22-30)  mmol/L


 


BUN  15  (9-20)  mg/dL


 


Creatinine  0.83  (0.66-1.25)  mg/dL


 


Glucose  77  (74-99)  mg/dL


 


Calcium  9.6  (8.4-10.2)  mg/dL


 


AST  33  (17-59)  U/L


 


ALT  37  (21-72)  U/L


 


Alkaline Phosphatase  49  ()  U/L


 


Total Protein  8.3 H  (6.3-8.2)  g/dL


 


Albumin  4.7  (3.5-5.0)  g/dL








 Calcium panel











  10/15/18 Range/Units





  13:45 


 


Calcium  9.6  (8.4-10.2)  mg/dL


 


Albumin  4.7  (3.5-5.0)  g/dL








 Pituitary panel











  10/15/18 Range/Units





  13:45 


 


Sodium  142  (137-145)  mmol/L


 


Potassium  4.0  (3.5-5.1)  mmol/L


 


Chloride  102  ()  mmol/L


 


Carbon Dioxide  30  (22-30)  mmol/L


 


BUN  15  (9-20)  mg/dL


 


Creatinine  0.83  (0.66-1.25)  mg/dL


 


Glucose  77  (74-99)  mg/dL


 


Calcium  9.6  (8.4-10.2)  mg/dL








 Adrenal panel











  10/15/18 Range/Units





  13:45 


 


Sodium  142  (137-145)  mmol/L


 


Potassium  4.0  (3.5-5.1)  mmol/L


 


Chloride  102  ()  mmol/L


 


Carbon Dioxide  30  (22-30)  mmol/L


 


BUN  15  (9-20)  mg/dL


 


Creatinine  0.83  (0.66-1.25)  mg/dL


 


Glucose  77  (74-99)  mg/dL


 


Calcium  9.6  (8.4-10.2)  mg/dL


 


Total Bilirubin  0.7  (0.2-1.3)  mg/dL


 


AST  33  (17-59)  U/L


 


ALT  37  (21-72)  U/L


 


Alkaline Phosphatase  49  ()  U/L


 


Total Protein  8.3 H  (6.3-8.2)  g/dL


 


Albumin  4.7  (3.5-5.0)  g/dL














Assessment and Plan


(1) Acute appendicitis


Narrative/Plan: 


Suspect mild partially treated acute appendicitis.  Options discussed with the 

patient and his family.  We have decided to proceed with laparoscopic 

appendectomy at this time.  Risks of bleeding, infection, negative 

intraoperative findings, abscess, dehiscence, hernia were discussed.  They 

understand and wish to proceed.


Current Visit: Yes   Status: Acute   Code(s): K35.80 - UNSPECIFIED ACUTE 

APPENDICITIS   SNOMED Code(s): 35467959

## 2018-10-15 NOTE — ED
General Adult HPI





- General


Chief complaint: Abdominal Pain


Stated complaint: Abd Pain


Time Seen by Provider: 10/15/18 13:08


Source: patient, RN notes reviewed, old records reviewed


Mode of arrival: ambulatory


Limitations: no limitations





- History of Present Illness


Initial comments: 





Patient's a 18-year-old male presented to the emergency room today with a chief 

complaint of right lower quadrant pain.  Patient does admit that he was seen 

here recently for possible appendicitis.  He states that he was discharged home 

stools been experiencing pain to the right lower quadrant.  Patient does admit 

that he did talk to Dr. Hines's office today was advised coming here to the 

emergency room for repeat CAT scan.  Patient mitts to pain to the right lower 

quadrant.  Denies any other complaints currently. Patient denies any recent 

fever, chills, shortness of breath, chest pain, numbness or tingling, headaches 

or visual changes, or any other complaints.





- Related Data


 Home Medications











 Medication  Instructions  Recorded  Confirmed


 


No Known Home Medications  10/15/18 10/15/18











 Allergies











Allergy/AdvReac Type Severity Reaction Status Date / Time


 


Iodinated Contrast- Oral and AdvReac  Cough/sneez Verified 10/15/18 14:22





IV Dye   e  














Review of Systems


ROS Statement: 


Those systems with pertinent positive or pertinent negative responses have been 

documented in the HPI.





ROS Other: All systems not noted in ROS Statement are negative.





Past Medical History


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  hx 

of pilonidal cyst


History of Any Multi-Drug Resistant Organisms: None Reported


Past Surgical History: No Surgical Hx Reported


Additional Past Surgical History / Comment(s): pilinodal cyst removed


Past Anesthesia/Blood Transfusion Reactions: No Reported Reaction


Additional Past Anesthesia/Blood Transfusion Reaction / Comment(s): NO PRIOR SX 

HX


Past Psychological History: No Psychological Hx Reported


Smoking Status: Never smoker


Past Alcohol Use History: None Reported


Past Drug Use History: None Reported





- Past Family History


  ** Mother


Family Medical History: No Reported History





  ** Father


Additional Family Medical History / Comment(s): hernia





General Exam





- General Exam Comments


Initial Comments: 





General:  The patient is awake and alert, in no distress, and does not appear 

acutely ill. 


Eye:  Pupils are equal, round and reactive to light. Extra-ocular movements are 

intact.  No nystagmus.  There is normal conjunctiva bilaterally.  No signs of 

icterus.  


Ears, nose, mouth and throat:  There are moist mucous membranes and no oral 

lesions. 


Neck:  The neck is supple, there is no tenderness or JVD.  


Cardiovascular:  There is a regular rate and rhythm. No murmur, rub or gallop 

is appreciated.


Respiratory:  Lungs are clear to auscultation, respirations are non-labored, 

breath sounds are equal.  No wheezes, stridor, rales, or rhonchi.


Gastrointestinal: Soft on palpation.  Patient does have tenderness over the 

arch.  No rebound, guarding or CVA tenderness.


Musculoskeletal:  Normal ROM, no tenderness.  Sensation intact. Strength 5/5. 

Pulses equal bilaterally 2+.  


Neurological:  A&O x 3. CN II-XII intact, There are no obvious motor or sensory 

deficits. Coordination appears grossly intact. Speech is normal.


Skin:  Skin is warm and dry and no rashes or lesions are noted. 


Psychiatric:  Cooperative, appropriate mood & affect, normal judgment.  


Limitations: no limitations





Course


 Vital Signs











  10/15/18





  12:53


 


Temperature 98.3 F


 


Pulse Rate 74


 


Respiratory 20





Rate 


 


Blood Pressure 109/78


 


O2 Sat by Pulse 99





Oximetry 














Medical Decision Making





- Medical Decision Making





Patient reexamined at this time shows no signs of distress.  He is resting 

comfortably.  Patient did have some throat scratchiness after CT.  Patient did 

have some sneezing of previous CAT scan a few days ago.  He was premedicated.  

At this time is symptom free with any throat irritation difficulty breathing, 

or swollen.  Patient's labs been reviewed no white count.  Patient's vitals are 

stable.  Patient has mild tenderness right lower quadrant.  CT of the abdomen 

and pelvis was performed and shows better visualization of the appendix which 

is mildly elevated and suggestion of the mucosal hyperemia.  No significant 

surrounding fat stranding noted.  Acute appendicitis is not excluded as read by 

radiology.  Case discussed with attending physician  who did discuss 

the case with patient's surgeon Dr. Hines : With the patient and recommend 

starting antibiotics.  Patient will be made nothing by mouth as there is a 

chance of going to the OR today.





- Lab Data


Result diagrams: 


 10/15/18 13:45





 10/15/18 13:45


 Lab Results











  10/15/18 10/15/18 10/15/18 Range/Units





  13:45 13:45 13:45 


 


WBC  7.4    (4.0-11.0)  k/uL


 


RBC  5.07    (4.30-5.90)  m/uL


 


Hgb  16.2    (13.0-17.5)  gm/dL


 


Hct  46.0    (39.0-53.0)  %


 


MCV  90.6    (80.0-100.0)  fL


 


MCH  31.8    (25.0-35.0)  pg


 


MCHC  35.2    (31.0-37.0)  g/dL


 


RDW  12.4    (11.5-15.5)  %


 


Plt Count  229    (150-450)  k/uL


 


Neutrophils %  65    %


 


Lymphocytes %  26    %


 


Monocytes %  5    %


 


Eosinophils %  3    %


 


Basophils %  1    %


 


Neutrophils #  4.8    (1.3-7.7)  k/uL


 


Lymphocytes #  1.9    (1.0-4.8)  k/uL


 


Monocytes #  0.4    (0-1.0)  k/uL


 


Eosinophils #  0.2    (0-0.7)  k/uL


 


Basophils #  0.0    (0-0.2)  k/uL


 


Sodium   142   (137-145)  mmol/L


 


Potassium   4.0   (3.5-5.1)  mmol/L


 


Chloride   102   ()  mmol/L


 


Carbon Dioxide   30   (22-30)  mmol/L


 


Anion Gap   10   mmol/L


 


BUN   15   (9-20)  mg/dL


 


Creatinine   0.83   (0.66-1.25)  mg/dL


 


Est GFR (CKD-EPI)AfAm   >90   (>60 ml/min/1.73 sqM)  


 


Est GFR (CKD-EPI)NonAf   >90   (>60 ml/min/1.73 sqM)  


 


Glucose   77   (74-99)  mg/dL


 


Calcium   9.6   (8.4-10.2)  mg/dL


 


Total Bilirubin   0.7   (0.2-1.3)  mg/dL


 


AST   33   (17-59)  U/L


 


ALT   37   (21-72)  U/L


 


Alkaline Phosphatase   49   ()  U/L


 


Total Protein   8.3 H   (6.3-8.2)  g/dL


 


Albumin   4.7   (3.5-5.0)  g/dL


 


Urine Color    Light Yellow  


 


Urine Appearance    Clear  (Clear)  


 


Urine pH    6.5  (5.0-8.0)  


 


Ur Specific Gravity    1.013  (1.001-1.035)  


 


Urine Protein    Negative  (Negative)  


 


Urine Glucose (UA)    Negative  (Negative)  


 


Urine Ketones    Negative  (Negative)  


 


Urine Blood    Negative  (Negative)  


 


Urine Nitrite    Negative  (Negative)  


 


Urine Bilirubin    Negative  (Negative)  


 


Urine Urobilinogen    <2.0  (<2.0)  mg/dL


 


Ur Leukocyte Esterase    Negative  (Negative)  














Disposition


Clinical Impression: 


 Abdominal pain





Disposition: ADMITTED AS IP TO THIS HOSP


Condition: Stable


Is patient prescribed a controlled substance at d/c from ED?: No


Referrals: 


None,Stated [Primary Care Provider] - 1-2 days


Time of Disposition: 15:29

## 2018-10-15 NOTE — P.OP
Date of Procedure: 10/15/18


Procedure(s) Performed: 


PREOPERATIVE DIAGNOSIS: Acute appendicitis


POSTOPERATIVE DIAGNOSIS: Same


PROCEDURE: Laparoscopic appendectomy


SURGEON: Flora


EBL: Total


ANESTHESIA: General


COMPLICATIONS: None


OPERATIVE PROCEDURE: The patient was brought and placed on the operating table 

in the supine position.  The patient was placed under general anesthesia.  The 

abdomen was prepped and draped in the usual sterile fashion.  A small 

curvilinear infraumbilical incision was made.  The fascia was retracted 

anteriorly with Amelia forceps.  The Veress needle was advanced into the 

peritoneal cavity.  The saline drop test was normal.  Insufflation took place 

to 15 mmHg.  A 5 mm trocar was then placed.  An additional 5 mm suprapubic 

trocar was placed under direct visualization as well as a 12 mm left lower 

quadrant trocar under direct visualization.  The appendix was inspected.  It 

was mildly inflamed.  The mesoappendix was dissected.  The base of the appendix 

was divided using a linear 45 mm intestinal stapler.  The mesentery itself was 

divided using a 12 mm clipper.  The area was then irrigated.  No further 

purulence or bleeding was seen.  The appendix was brought out of the peritoneal 

cavity through the left lower quadrant trocar site within the trocar itself.  

The fascia at the 12 mm site was closed using a figure-of-eight 0 Vicryl 

stitch.  The skin at all 3 sites was closed using 4-0 Monocryl sutures.  Steri-

Strips and sterile dressings then applied.


DISPOSITION: Stable to recovery room

## 2018-10-16 VITALS
SYSTOLIC BLOOD PRESSURE: 114 MMHG | TEMPERATURE: 98.3 F | RESPIRATION RATE: 18 BRPM | HEART RATE: 81 BPM | DIASTOLIC BLOOD PRESSURE: 75 MMHG

## 2018-10-16 RX ADMIN — HYDROCODONE BITARTRATE AND ACETAMINOPHEN PRN EACH: 5; 325 TABLET ORAL at 15:52

## 2018-10-16 RX ADMIN — HYDROMORPHONE HYDROCHLORIDE PRN MG: 1 INJECTION, SOLUTION INTRAMUSCULAR; INTRAVENOUS; SUBCUTANEOUS at 04:07

## 2018-10-16 RX ADMIN — HEPARIN SODIUM SCH UNIT: 5000 INJECTION, SOLUTION INTRAVENOUS; SUBCUTANEOUS at 07:58

## 2018-10-16 RX ADMIN — DEXTROSE MONOHYDRATE SCH MLS/HR: 5 INJECTION, SOLUTION INTRAVENOUS at 07:58

## 2018-10-16 RX ADMIN — HYDROCODONE BITARTRATE AND ACETAMINOPHEN PRN EACH: 5; 325 TABLET ORAL at 11:54

## 2018-10-16 RX ADMIN — HYDROCODONE BITARTRATE AND ACETAMINOPHEN PRN EACH: 5; 325 TABLET ORAL at 08:00

## 2018-10-16 NOTE — P.DS
Providers


Date of admission: 


10/15/18 15:37





Expected date of discharge: 10/16/18


Attending physician: 


Dany Hines





Primary care physician: 


Stated None








- Discharge Diagnosis(es)


(1) Acute appendicitis


Patient was admitted yesterday with suspected acute appendicitis.  He underwent 

laparoscopic appendectomy.  Doing well today.  Pain is improved.  Incisions are 

healing nicely.  He is tolerating a diet.  We'll discharge today with 

outpatient follow-up in 1 week.  Oral pain medicine prescription provided.


Status: Acute   


Patient Condition at Discharge: Stable





Plan - Discharge Summary


New Discharge Prescriptions: 


New


   HYDROcodone/APAP 5-325MG [Norco 5-325] 1 each PO Q4HR PRN #12 tab


     PRN Reason: Mild Pain


Discharge Medication List





HYDROcodone/APAP 5-325MG [Norco 5-325] 1 each PO Q4HR PRN #12 tab 10/16/18 [Rx]








Follow up Appointment(s)/Referral(s): 


Dany Hines MD [Medical Doctor] - 1 Week ([facility], OCTOBER 24TH AT 3:30)


None,Stated [Primary Care Provider] - 1-2 days


Patient Instructions/Handouts:  Laparoscopic Appendectomy (DC)


Activity/Diet/Wound Care/Special Instructions: 


FOLLOW UP AS WRITTEN, SOONER IF PROBLEMS OR CONCERNS IE..FEVER, CHILLS, REDNESS 

OR FOUL DRAINAGE FROM INCISIONAL SITES, PAIN NOT RELIEVED BY PRESCRIPTION MEDS. 

SHOWER DAILY, NO TUB BATHS, SWIMMING POOLS, OR HOT TUBS. NO DRIVING WHILE 

TAKING NARCOTICS. NO HEAVY LIFTING, PULLING OR PUSHING OF OBJECTS. LIGHT DIET 

AND DRINK PLENTY OF LIQUIDS.


Discharge/Stand Alone Forms:  Work/Release Restrictions Form


Discharge Disposition: HOME SELF-CARE

## 2019-02-19 ENCOUNTER — HOSPITAL ENCOUNTER (EMERGENCY)
Dept: HOSPITAL 47 - EC | Age: 19
Discharge: HOME | End: 2019-02-19
Payer: COMMERCIAL

## 2019-02-19 VITALS
HEART RATE: 76 BPM | TEMPERATURE: 98.3 F | SYSTOLIC BLOOD PRESSURE: 143 MMHG | RESPIRATION RATE: 20 BRPM | DIASTOLIC BLOOD PRESSURE: 85 MMHG

## 2019-02-19 DIAGNOSIS — R51: Primary | ICD-10-CM

## 2019-02-19 DIAGNOSIS — Z79.899: ICD-10-CM

## 2019-02-19 DIAGNOSIS — R42: ICD-10-CM

## 2019-02-19 DIAGNOSIS — Z91.041: ICD-10-CM

## 2019-02-19 PROCEDURE — 72125 CT NECK SPINE W/O DYE: CPT

## 2019-02-19 PROCEDURE — 70450 CT HEAD/BRAIN W/O DYE: CPT

## 2019-02-19 PROCEDURE — 99284 EMERGENCY DEPT VISIT MOD MDM: CPT

## 2019-02-19 PROCEDURE — 96374 THER/PROPH/DIAG INJ IV PUSH: CPT

## 2019-02-19 PROCEDURE — 96375 TX/PRO/DX INJ NEW DRUG ADDON: CPT

## 2019-02-19 NOTE — ED
General Adult HPI





- General


Chief complaint: Headache


Stated complaint: Headache, dizzy


Time Seen by Provider: 02/19/19 21:28


Source: patient, RN notes reviewed, old records reviewed


Mode of arrival: ambulatory


Limitations: no limitations





- History of Present Illness


Initial comments: 


18-year-old male patient with no pertinent past medical history presents to ED 

for approximately 1 month of waxing and waning headache and temporal lobe.  

Patient reports that he was wrestling with friend approximately one month ago, 

he fell to the ground and hit his occipital lobe on the ground.  She reports he 

has had headache since.  Patient denies any loss of consciousness.  Patient 

denies any use of blood thinners.  Patient reports that he has a dull pain in 

his temporal lobe which waxes and wanes throughout the day.  Patient states 

that he does experience this headache most every day.  Patient denies waking up 

headache, states that it has an insidious onset.  Patient reports that he has 

had some blurred vision in the past, denies any blurred vision now.  Patient 

states that his headache improves without improvement.  Patient denies other 

complaints.





Systemic: Pt denies fatigue, myalgia, fever/chills, rash. Pt denies weakness, 

night sweats, weight loss. 


Neuro: Pt denies syncope or pre-syncope.


HEENT: Pt denies ocular discharge or irritation, otalgia, rhinorrhea, 

pharyngitis or notable lymphadenopathy. 


Cardiopulmonary: Pt denies chest pain, SOB, heart palpitations, dyspnea on 

exertion.  


Abdominal/GI: Pt denies abdominal pain, n/v/d. 


: Pt denies dysuria, burning w/ urination, frequency/urgency. Denies new 

onset urinary or bowel incontinence.  


MSK: Pt denies myalgia, loss of strength or function in extremities. 


Neuro: Pt denies new onset weakness, paresthesias. 








- Related Data


 Home Medications











 Medication  Instructions  Recorded  Confirmed


 


Ibuprofen [Motrin Ib] 200 mg PO Q4HR PRN 02/19/19 02/19/19


 


Omeprazole 40 mg PO DAILY 02/19/19 02/19/19








 Previous Rx's











 Medication  Instructions  Recorded


 


Ibuprofen [Motrin] 600 mg PO Q6HR PRN #40 day 02/19/19











 Allergies











Allergy/AdvReac Type Severity Reaction Status Date / Time


 


Iodinated Contrast- Oral and AdvReac  Cough/sneez Verified 02/19/19 21:49





IV Dye   e  














Review of Systems


ROS Statement: 


Those systems with pertinent positive or pertinent negative responses have been 

documented in the HPI.





ROS Other: All systems not noted in ROS Statement are negative.





Past Medical History


Past Medical History: No Reported History


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  hx 

of pilonidal cyst


History of Any Multi-Drug Resistant Organisms: None Reported


Past Surgical History: Appendectomy


Additional Past Surgical History / Comment(s): pilinodal cyst removed, appy 

today


Past Anesthesia/Blood Transfusion Reactions: No Reported Reaction


Additional Past Anesthesia/Blood Transfusion Reaction / Comment(s): NO PRIOR SX 

HX


Past Psychological History: No Psychological Hx Reported


Smoking Status: Never smoker


Past Alcohol Use History: None Reported


Past Drug Use History: None Reported





- Past Family History


  ** Mother


Family Medical History: No Reported History





  ** Father


Family Medical History: No Reported History


Additional Family Medical History / Comment(s): hernia





General Exam





- General Exam Comments


Initial Comments: 


Constitutional: NAD, AOX3, Pt has pleasant affect. 


HEENT: NC/AT, trachea midline, neck supple, no lymphadenopathy. Posterior 

pharynx non erythematous, without exudates. External ears appear normal, 

without discharge. Mucous membranes moist. Eyes PERRLA, EOM intact. There is no 

scleral icterus. No pallor noted. 


Cardiopulmonary: RRR, no murmurs, rubs or gallops, no JVD noted. Lungs CTAB in 

anterior and posterior fields. No peripheral edema. 


Abdominal exam: Abdomen soft and non-distended. Abdomen non-tender to palpation 

in all 4 quadrants. Bowel sounds active in LLQ. No hepatosplenomegaly. No 

ecchymosis


Neuro: CN II-XII intact. No nuchal rigidity. No focal deficit or facial droop. 


MSK: No posterior calf tenderness bilaterally, homans sign negative 

bilaterally. Posterior tibialis and radial pulse +2 bilaterally. Sensation 

intact in upper and lower extremities. Full active ROM in upper and lower 

extremities, 5/5 stregnth. 





Limitations: no limitations





Course


 Vital Signs











  02/19/19





  21:14


 


Temperature 98.3 F


 


Pulse Rate 76


 


Respiratory 20





Rate 


 


Blood Pressure 143/85


 


O2 Sat by Pulse 99





Oximetry 














Medical Decision Making





- Medical Decision Making


18-year-old male patient presents to ED with approximately 1 month of waxing 

and waning occipital lobe headaches.  Patient did suffer a trauma to his 

occipital lobe approximate one month ago, wrestling with friend.  He had 

additionally complained of some blurred vision, denies any current blurred 

vision. Pt VSS, afebrile.  Physical exam displayed normal neuro exam, no nuchal 

rigidity.  Shared decision making was conducted with patient.  Patient will 

prefer to have CT of brain and cervical spine for reassurance.  CT of brain and 

cervical spine not display acute pathology.  Headache resolved with Benadryl, 

Reglan, Tylenol.  Patient comfortable with discharge.  Patient does not have 

primary care provider, will be referred to [facility].  Patient to return 

to ED if new signs or symptoms develop or condition worsens in any way.  

Patient to use ibuprofen for headache. Case discussed in depth with Dr. Desouza.








Disposition


Clinical Impression: 


 Headache





Disposition: HOME SELF-CARE


Condition: Stable


Instructions (If sedation given, give patient instructions):  Acute Headache (ED

)


Additional Instructions: 


Patient to adhere to previously discussed treatment plan and will take 

medication(s) as directed. Patient to follow up with PCP in 1-2 days. Patient 

to return to ED if symptoms do not improve. 


Prescriptions: 


Ibuprofen [Motrin] 600 mg PO Q6HR PRN #40 day


 PRN Reason: Pain


Is patient prescribed a controlled substance at d/c from ED?: No


Referrals: 


None,Stated [Primary Care Provider] - 1-2 days


[facility] [[facility] - 1-2 days

## 2019-02-19 NOTE — CT
History: ITS.REASON CT

Reason: Pain

 

Exam: CT HEAD Without Contrast

Technique more: CTDI is 45.2 mGy and DLP is 1043 mGy-cm.

Technique more: This CT exam was performed using one or more of the 

following dose reduction techniques: automated exposure control, 

adjustment of the mA and/or kV according to patient size, and/or use of 

iterative reconstruction technique.

 

Comparison: None available

 

FINDINGS:

 

No intracranial hemorrhage, mass effect or calvarial fracture.  The 

ventricles are within limits and midline.  The visualized paranasal 

sinuses, mastoids and orbits are within limits.

 

IMPRESSION:

 

No intracranial hemorrhage, mass effect or calvarial fracture.  

 

Exam: CT C SPINE Without Contrast

Technique more: CTDI is 7.7 mGy and DLP is 235.5 mGy-cm.

Technique more: This CT exam was performed using one or more of the 

following dose reduction techniques: automated exposure control, 

adjustment of the mA and/or kV according to patient size, and/or use of 

iterative reconstruction technique.

 

Comparison: None available

 

FINDINGS:

 

No fracture or malalignment.  Straightening may represent position or 

spasm.  No evidence of prevertebral swelling.  The disc spaces appear 

within limits.  The visualized apices appear clear.

 

IMPRESSION:

 

No fracture or malalignment.  

 

Straightening may represent position or spasm.  No evidence of 

prevertebral swelling.

## 2020-07-15 ENCOUNTER — HOSPITAL ENCOUNTER (OUTPATIENT)
Dept: HOSPITAL 47 - LABWHC1 | Age: 20
Discharge: HOME | End: 2020-07-15
Attending: SURGERY
Payer: MEDICAID

## 2020-07-15 DIAGNOSIS — K40.20: Primary | ICD-10-CM

## 2020-07-15 LAB
ERYTHROCYTE [DISTWIDTH] IN BLOOD BY AUTOMATED COUNT: 5.37 M/UL (ref 4.3–5.9)
ERYTHROCYTE [DISTWIDTH] IN BLOOD: 12.4 % (ref 11.5–15.5)
HCT VFR BLD AUTO: 49 % (ref 39–53)
HGB BLD-MCNC: 16.8 GM/DL (ref 13–17.5)
MCH RBC QN AUTO: 31.3 PG (ref 25–35)
MCHC RBC AUTO-ENTMCNC: 34.3 G/DL (ref 31–37)
MCV RBC AUTO: 91.3 FL (ref 80–100)
PLATELET # BLD AUTO: 260 K/UL (ref 150–450)
WBC # BLD AUTO: 6.8 K/UL (ref 3.8–10.6)

## 2020-07-15 PROCEDURE — 36415 COLL VENOUS BLD VENIPUNCTURE: CPT

## 2020-07-15 PROCEDURE — 85027 COMPLETE CBC AUTOMATED: CPT

## 2020-07-17 ENCOUNTER — HOSPITAL ENCOUNTER (OUTPATIENT)
Dept: HOSPITAL 47 - OR | Age: 20
Discharge: HOME | End: 2020-07-17
Attending: SURGERY
Payer: MEDICAID

## 2020-07-17 VITALS — DIASTOLIC BLOOD PRESSURE: 73 MMHG | HEART RATE: 109 BPM | SYSTOLIC BLOOD PRESSURE: 121 MMHG

## 2020-07-17 VITALS — BODY MASS INDEX: 21.2 KG/M2

## 2020-07-17 VITALS — RESPIRATION RATE: 16 BRPM

## 2020-07-17 VITALS — TEMPERATURE: 98.8 F

## 2020-07-17 DIAGNOSIS — K21.9: ICD-10-CM

## 2020-07-17 DIAGNOSIS — Z84.89: ICD-10-CM

## 2020-07-17 DIAGNOSIS — Z98.890: ICD-10-CM

## 2020-07-17 DIAGNOSIS — Z91.041: ICD-10-CM

## 2020-07-17 DIAGNOSIS — K40.20: Primary | ICD-10-CM

## 2020-07-17 DIAGNOSIS — Z79.899: ICD-10-CM

## 2020-07-17 PROCEDURE — 49650 LAP ING HERNIA REPAIR INIT: CPT

## 2020-07-17 RX ADMIN — ONDANSETRON ONE MG: 2 INJECTION INTRAMUSCULAR; INTRAVENOUS at 11:19

## 2020-07-17 RX ADMIN — HEPARIN SODIUM ONE UNIT: 5000 INJECTION, SOLUTION INTRAVENOUS; SUBCUTANEOUS at 11:18

## 2020-07-17 RX ADMIN — LIDOCAINE HYDROCHLORIDE PRN ML: 10 INJECTION, SOLUTION INFILTRATION; PERINEURAL at 11:00

## 2020-07-17 RX ADMIN — HYDROMORPHONE HYDROCHLORIDE PRN MG: 1 INJECTION, SOLUTION INTRAMUSCULAR; INTRAVENOUS; SUBCUTANEOUS at 14:26

## 2020-07-17 RX ADMIN — ONDANSETRON ONE MG: 2 INJECTION INTRAMUSCULAR; INTRAVENOUS at 11:14

## 2020-07-17 RX ADMIN — HYDROMORPHONE HYDROCHLORIDE PRN MG: 1 INJECTION, SOLUTION INTRAMUSCULAR; INTRAVENOUS; SUBCUTANEOUS at 13:59

## 2020-07-17 RX ADMIN — LIDOCAINE HYDROCHLORIDE PRN ML: 10 INJECTION, SOLUTION INFILTRATION; PERINEURAL at 11:18

## 2020-07-17 RX ADMIN — DEXTROSE ONE MG: 50 INJECTION, SOLUTION INTRAVENOUS at 11:19

## 2020-07-17 RX ADMIN — HYDROMORPHONE HYDROCHLORIDE PRN MG: 1 INJECTION, SOLUTION INTRAMUSCULAR; INTRAVENOUS; SUBCUTANEOUS at 14:34

## 2020-07-17 RX ADMIN — HYDROMORPHONE HYDROCHLORIDE PRN MG: 1 INJECTION, SOLUTION INTRAMUSCULAR; INTRAVENOUS; SUBCUTANEOUS at 13:52

## 2020-07-17 RX ADMIN — HEPARIN SODIUM ONE UNIT: 5000 INJECTION, SOLUTION INTRAVENOUS; SUBCUTANEOUS at 11:14

## 2020-07-17 RX ADMIN — DEXTROSE ONE MG: 50 INJECTION, SOLUTION INTRAVENOUS at 11:14

## 2020-07-17 NOTE — P.GSHP
History of Present Illness


H&P Date: 07/17/20


Chief Complaint: Bilateral inguinal hernia





Patient known to our service.  Patient underwent previous laparoscopic 

appendectomy.  At the time of appendectomy patient was found to have small 

bilateral indirect inguinal hernias.  Patient does a lot of lifting at work.  

Lately he has had pain bilateral groin right greater than left.  Feels a small 

bulge at times.





Past Medical History


Past Medical History: No Reported History


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  hx 

of pilonidal cyst


History of Any Multi-Drug Resistant Organisms: None Reported


Past Surgical History: Appendectomy


Additional Past Surgical History / Comment(s): pilonidal cyst removed,


Past Anesthesia/Blood Transfusion Reactions: No Reported Reaction


Additional Past Anesthesia/Blood Transfusion Reaction / Comment(s): NO PRIOR SX 

HX


Smoking Status: Never smoker





- Past Family History


  ** Mother


Family Medical History: No Reported History





  ** Father


Family Medical History: No Reported History


Additional Family Medical History / Comment(s): hernia





Medications and Allergies


                                Home Medications











 Medication  Instructions  Recorded  Confirmed  Type


 


Omeprazole 40 mg PO DAILY 02/19/19 07/17/20 History








                                    Allergies











Allergy/AdvReac Type Severity Reaction Status Date / Time


 


Iodinated Contrast Media AdvReac  Cough/sneez Verified 07/17/20 10:45





[Iodinated Contrast- Oral   e  





and IV Dye]     














Surgical - Exam


                                   Vital Signs











Temp Pulse Resp BP Pulse Ox


 


 98.9 F   84   16   136/92   97 


 


 07/17/20 10:39  07/17/20 10:39  07/17/20 10:39  07/17/20 10:39  07/17/20 10:39

















Physical exam:


General: Well-developed, well-nourished


HEENT: Normocephalic, sclerae nonicteric


Abdomen: Nontender, nondistended, small inguinal hernia bilaterally


Extremities: No edema


Neuro: Alert and oriented





Assessment and Plan


(1) Bilateral inguinal hernia


Narrative/Plan: 


Will proceed with the da Alta assisted laparoscopic repair bilateral inguinal 

hernia with mesh, possible open.  Risks of bleeding, infection, recurrence, 

bladder and bowel injury, numbness, nerve injury, conversion to an open 

procedure were discussed with the patient.  The patient understands and wishes 

to proceed.


Current Visit: Yes   Status: Acute   Code(s): K40.20 - BI INGUINAL HERNIA, W/O 

OBST OR GANGRENE, NOT SPCF AS RECUR   SNOMED Code(s): 68811503

## 2020-07-17 NOTE — P.OP
Date of Procedure: 07/17/20


Procedure(s) Performed: 


PREOPERATIVE DIAGNOSIS: Bilateral inguinal hernia


POSTOPERATIVE DIAGNOSIS: Bilateral inguinal hernia


PROCEDURE: Laparoscopic repair bilateral indirect inguinal hernia with the da 

Alta robot assistance with mesh


SURGEON: Flora


EBL: Minimal


ANESTHESIA: General


COMPLICATIONS: None


OPERATIVE PROCEDURE: Patient was placed in the operating table in the supine 

position.  The patient was placed under general anesthesia.  The abdomen was 

prepped and draped in usual sterile fashion.  A small curvilinear supraumbilical

incision was made.  The fascia was retracted anteriorly with Liya forceps.  The

Veress needle was inserted.  The saline drop test was normal.  Insufflation took

place to 15 mmHg. an 8 mm trocar was then inserted.  2 additional 8 mm trochars 

were placed in the right upper quadrant and left upper quadrant under 

visualization.  The robotic arms were then brought in and docked into place.  

The fenestrated bipolar was used in the left arm and the laparoscopic ana was

utilized in the right arm.  A 30 8 mm scope was used in the up position.  The 

peritoneal cavity was inspected.  The patient had evidence of a small moderate 

sized indirect hernia on the right and a small indirect hernia on the left.  

There was the beginning of a direct hernia on the left as well.  The right side 

was first addressed.  The right sided peritoneum was incised in a horizontal 

fashion cephalad to the internal inguinal ring.  Following that careful 

dissection of the preperitoneal space took place.  This took place using both 

electrocautery, sharp dissection but primarily blunt dissection.  Visualization 

of the pubic tubercle and Turner's ligament took place medially.  Full diss

ection took place laterally as well.  The patient's hernia was again noted to be

indirect on the side.  The hernia sac was fully dissected.  Once we had adequate

space the 15 x 10 progrip mesh was advanced into the preperitoneal space and 

flattened out appropriately to cover all potential hernia sites.  No sutures 

were used.  The left side was then addressed.  In a similar fashion the 

peritoneum was incised.  Dissection again took place thoroughly using primarily 

blunt dissection.  The indirect and direct hernia on this side was fully 

reduced.  The space was fully dissected to allow for placement of the mesh.  The

15 x 10 mm Prograf mesh was again utilized and flattened out appropriately.  The

mesh overlapped nicely in the midline anterior to the bladder.  The defect 

bilaterally was closed using a running 20V lock suture.  A small peritoneal 

defect on the left-hand side was closed using a running locking 3-0 Vicryl 

suture.  The sutures were then removed.  The pneumoperitoneum was then 

evacuated.  The skin of all 3 sites was closed using a 4-0 Monocryl stitch.  

Skin glue and sterile dressings were applied.


DISPOSITION: Stable to recovery room

## 2020-11-06 ENCOUNTER — HOSPITAL ENCOUNTER (OUTPATIENT)
Dept: HOSPITAL 47 - LABWHC1 | Age: 20
Discharge: HOME | End: 2020-11-06
Attending: PEDIATRICS
Payer: MEDICAID

## 2020-11-06 DIAGNOSIS — Z03.818: Primary | ICD-10-CM

## 2020-11-06 PROCEDURE — 87635 SARS-COV-2 COVID-19 AMP PRB: CPT

## 2020-11-09 ENCOUNTER — HOSPITAL ENCOUNTER (OUTPATIENT)
Dept: HOSPITAL 47 - LABWHC1 | Age: 20
Discharge: HOME | End: 2020-11-09
Attending: PEDIATRICS
Payer: MEDICAID

## 2020-11-09 DIAGNOSIS — Z20.828: Primary | ICD-10-CM

## 2020-11-09 PROCEDURE — 87635 SARS-COV-2 COVID-19 AMP PRB: CPT

## 2022-06-13 ENCOUNTER — HOSPITAL ENCOUNTER (EMERGENCY)
Dept: HOSPITAL 47 - EC | Age: 22
Discharge: HOME | End: 2022-06-13
Payer: MEDICAID

## 2022-06-13 VITALS
SYSTOLIC BLOOD PRESSURE: 131 MMHG | TEMPERATURE: 98.2 F | HEART RATE: 88 BPM | DIASTOLIC BLOOD PRESSURE: 86 MMHG | RESPIRATION RATE: 16 BRPM

## 2022-06-13 DIAGNOSIS — S62.306A: Primary | ICD-10-CM

## 2022-06-13 DIAGNOSIS — Z91.041: ICD-10-CM

## 2022-06-13 DIAGNOSIS — F17.209: ICD-10-CM
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
## 2022-06-13 PROCEDURE — 29125 APPL SHORT ARM SPLINT STATIC: CPT

## 2022-06-13 PROCEDURE — 99283 EMERGENCY DEPT VISIT LOW MDM: CPT
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
## 2022-06-13 NOTE — XR
EXAMINATION TYPE: XR hand complete RT

 

DATE OF EXAM: 6/13/2022

 

COMPARISON: NONE

 

HISTORY: Trauma. Pain

 

TECHNIQUE: 3 views

 

FINDINGS: There is nondisplaced oblique fracture of the proximal shaft of the fifth metacarpal. The j
oint spaces are normal. Fingers are intact. Carpal bones are intact.

 

IMPRESSION: Acute nondisplaced fracture of the fifth metacarpal proximal shaft